# Patient Record
(demographics unavailable — no encounter records)

---

## 2025-05-15 NOTE — PHYSICAL EXAM
[Tenderness] : nontender [Enlarged ___ wks] : not enlarged [Mass ___ cm] : no uterine mass was palpated [FreeTextEntry5] : pap done [FreeTextEntry9] : guaiac-